# Patient Record
Sex: MALE | Race: WHITE | URBAN - NONMETROPOLITAN AREA
[De-identification: names, ages, dates, MRNs, and addresses within clinical notes are randomized per-mention and may not be internally consistent; named-entity substitution may affect disease eponyms.]

---

## 2018-04-24 NOTE — PATIENT DISCUSSION
New Prescription: erythromycin (erythromycin): ointment: 5 mg/gram (0.5 %) 1 a small amount twice a day as directed into both eyes 04-

## 2018-04-24 NOTE — PATIENT DISCUSSION
DERMATOCHALASIS OU: I have discussed with the patient that with age, the skin of the eyelid can sag and droop. In addition, the fat that surrounds and cushions the eyeball can bulge forward through the skin of the upper and lower lids. This can result in excess skin weighing down the upper lids, pushing down eyelashes and in some cases blocking peripheral vision, eye strain, and skin irritation. The risks of the procedure include but are not limited to pain, bleeding, infection, further surgery, scarring, injury to eye, dry eye syndrome, and lid asymmetry. Significant swelling and bruising after surgery should be expected. The patient was instructed to use ice and heat following the procedure, as well as artificial tears to reduce these symptoms. Margin distance 1 for the eye lids  The patient understands and elects to proceed with blepharoplasty. An Rx of Erythromycin oinment was given to be used as directed.

## 2019-05-07 ENCOUNTER — IMPORTED ENCOUNTER (OUTPATIENT)
Dept: URBAN - NONMETROPOLITAN AREA CLINIC 1 | Facility: CLINIC | Age: 59
End: 2019-05-07

## 2019-05-07 PROBLEM — H00.15: Noted: 2019-05-07

## 2019-05-07 PROCEDURE — 99202 OFFICE O/P NEW SF 15 MIN: CPT

## 2019-05-07 NOTE — PATIENT DISCUSSION
Chalazion Left Lower Lid- Recommend and discussed lid hygiene and the importance of warm compresses with [place] [employer] Shampoo twice daily. - The patient was instructed to contact our office and explained to the patient  the need for incision and drainage of chronic inflammation if the chalazion does not resolve. - Instruction sheet given to patient today.

## 2020-08-04 NOTE — PATIENT DISCUSSION
HYDROXYCHLOROQUINE (PLAQUENIL) USE:  NO OCULAR TOXICITY OU. CONTINUE PLAQUENIL AS DIRECTED. MAC OCT AND COLOR PLATES WITHIN NORMAL LIMITS OU. SCHEDULE YEARLY HVF10-2  AND ASSESMENT OF COLOR PLATES. PATIENT INSTRUCTED TO RETURN TO PCP.

## 2020-08-04 NOTE — PATIENT DISCUSSION
DERMATOCHALASIS/PTOSIS  OU:  BOTHERSOME TO THE PATIENT AT THIS TIME BUT PATIENT NOT INTERESTED IN SURGICAL INTERVENTION AT THIS TIME. RETURN TO DR Chandrakant Hodges IF PATIENT DESIRES SURGERY.

## 2021-10-13 NOTE — PATIENT DISCUSSION
HYDROXYCHLOROQUINE (PLAQUENIL) USE FOR RA: NO OCULAR TOXICITY OU. CONTINUE PLAQUENIL AS DIRECTED. MAC OCT AND COLOR PLATES WITHIN NORMAL LIMITS OU. SCHEDULE YEARLY HVF10-2 AND ASSESMENT OF COLOR PLATES. PATIENT INSTRUCTED TO RETURN TO PCP.

## 2021-10-13 NOTE — PATIENT DISCUSSION
CATARACTS, OU: VISUALLY SIGNIFICANT. OPTION OF SURGERY VERSUS FOLLOWING VERSUS UPDATING GLASSES DISCUSSED.  RBA'S DISCUSSED, PATIENT UNDERSTANDS AND DESIRES SURGERY TO INCREASE VISION FOR READING, WORKING ON THE COMPUTER AND GLARE FROM [place] AT NIGHT.  SCHEDULE CATARACT SURGERY/PRE-OP OU.

## 2021-10-13 NOTE — PATIENT DISCUSSION
DERMATOCHALASIS/PTOSIS OU: BOTHERSOME TO THE PATIENT AT THIS TIME BUT PATIENT NOT INTERESTED IN SURGICAL INTERVENTION AT THIS TIME. RETURN TO DR Alma Mars IF PATIENT DESIRES SURGERY.

## 2021-10-26 NOTE — PATIENT DISCUSSION
RBA'S DISCUSSED, PATIENT UNDERSTANDS AND DESIRES TO PROCEED WITH SURGERY.  CONSENT READ AND SIGNED.  PATIENT DESIRES STANDARD FOR DISTANCE OD.

## 2021-12-15 NOTE — PATIENT DISCUSSION
Small central k abrasion today. Educated patient on findings. Prescribe PF artificial tears q1h today then prn thereafter. Advised to call/RTC if pain persists or worsens.

## 2021-12-28 NOTE — PATIENT DISCUSSION
Cataract surgery has been performed in the first Eye and activities of daily living are still impaired for reading, seeing street signs and driving at night due to glare. The patient would like to proceed with cataract surgery in the second eye as scheduled. The option to not proceed with the second eye has been discussed, but the patient would like to proceed. Home

## 2022-04-09 ASSESSMENT — VISUAL ACUITY
OS_CC: 20/40-
OD_CC: 20/50-

## 2022-07-30 ENCOUNTER — TELEPHONE ENCOUNTER (OUTPATIENT)
Age: 62
End: 2022-07-30

## 2022-07-31 ENCOUNTER — TELEPHONE ENCOUNTER (OUTPATIENT)
Age: 62
End: 2022-07-31